# Patient Record
Sex: FEMALE | Race: BLACK OR AFRICAN AMERICAN
[De-identification: names, ages, dates, MRNs, and addresses within clinical notes are randomized per-mention and may not be internally consistent; named-entity substitution may affect disease eponyms.]

---

## 2014-09-28 VITALS
SYSTOLIC BLOOD PRESSURE: 126 MMHG | SYSTOLIC BLOOD PRESSURE: 126 MMHG | DIASTOLIC BLOOD PRESSURE: 70 MMHG | DIASTOLIC BLOOD PRESSURE: 70 MMHG | SYSTOLIC BLOOD PRESSURE: 126 MMHG | SYSTOLIC BLOOD PRESSURE: 126 MMHG | SYSTOLIC BLOOD PRESSURE: 126 MMHG | DIASTOLIC BLOOD PRESSURE: 70 MMHG | DIASTOLIC BLOOD PRESSURE: 70 MMHG | DIASTOLIC BLOOD PRESSURE: 70 MMHG | DIASTOLIC BLOOD PRESSURE: 70 MMHG | SYSTOLIC BLOOD PRESSURE: 126 MMHG | DIASTOLIC BLOOD PRESSURE: 70 MMHG | SYSTOLIC BLOOD PRESSURE: 126 MMHG | SYSTOLIC BLOOD PRESSURE: 126 MMHG | DIASTOLIC BLOOD PRESSURE: 70 MMHG | DIASTOLIC BLOOD PRESSURE: 70 MMHG | SYSTOLIC BLOOD PRESSURE: 126 MMHG

## 2018-12-13 ENCOUNTER — HOSPITAL ENCOUNTER (OUTPATIENT)
Dept: HOSPITAL 61 - KCIC MAMMO | Age: 58
Discharge: HOME | End: 2018-12-13
Attending: FAMILY MEDICINE
Payer: COMMERCIAL

## 2018-12-13 DIAGNOSIS — Z12.31: Primary | ICD-10-CM

## 2018-12-13 PROCEDURE — 77067 SCR MAMMO BI INCL CAD: CPT

## 2018-12-13 NOTE — [facility]
Bilateral digital screening mammograms:

 

Reason for examination: Routine screening.

 

Comparison is made to previous studies dated 9/29/2015 and 8/21/2014.

 

Interpretation was made with the benefit of CAD.

 

The skin and nipples show no abnormalities. No abnormal axillary lymph 

nodes are seen. The breast parenchyma shows scattered fibroglandular 

density. (Breast density: Category B.) There are small nodular parenchymal

densities consistent with intramammary lymph nodes bilaterally. There are 

no new dominant masses, suspicious calcifications or architectural 

distortions.

 

Impression:

 

No evidence of malignancy. Recommend routine screening.

 

BI-RADS Category 2: Benign.

 

"Our facility is accredited by the [employer] 

Mammography Program."

 

This patient's information has been entered into a reminder system for the

patient to be notified with the results of her examination and a target 

date for the next mammogram.

 

Electronically signed by: Fauzia Florentino MD (12/13/2018 4:54 PM) [id number]

## 2020-12-17 ENCOUNTER — HOSPITAL ENCOUNTER (OUTPATIENT)
Dept: HOSPITAL 61 - SPEC | Age: 60
End: 2020-12-17
Attending: FAMILY MEDICINE
Payer: COMMERCIAL

## 2020-12-17 DIAGNOSIS — E11.9: ICD-10-CM

## 2020-12-17 DIAGNOSIS — E78.5: ICD-10-CM

## 2020-12-17 DIAGNOSIS — I12.9: Primary | ICD-10-CM

## 2020-12-17 LAB
ALBUMIN SERPL-MCNC: 3.5 G/DL (ref 3.4–5)
ALBUMIN/GLOB SERPL: 1 {RATIO} (ref 1–1.7)
ALP SERPL-CCNC: 79 U/L (ref 46–116)
ALT SERPL-CCNC: 33 U/L (ref 14–59)
AMYLASE SERPL-CCNC: 81 U/L (ref 25–115)
ANION GAP SERPL CALC-SCNC: 8 MMOL/L (ref 6–14)
APTT PPP: YELLOW S
AST SERPL-CCNC: 23 U/L (ref 15–37)
BACTERIA #/AREA URNS HPF: 0 /HPF
BASOPHILS # BLD AUTO: 0 X10^3/UL (ref 0–0.2)
BASOPHILS NFR BLD: 0 % (ref 0–3)
BILIRUB SERPL-MCNC: 0.3 MG/DL (ref 0.2–1)
BILIRUB UR QL STRIP: NEGATIVE
BUN SERPL-MCNC: 19 MG/DL (ref 7–20)
BUN/CREAT SERPL: 21 (ref 6–20)
CALCIUM SERPL-MCNC: 9.1 MG/DL (ref 8.5–10.1)
CHLORIDE SERPL-SCNC: 104 MMOL/L (ref 98–107)
CHOLEST SERPL-MCNC: 205 MG/DL (ref 0–200)
CHOLEST/HDLC SERPL: 3.4 {RATIO}
CO2 SERPL-SCNC: 30 MMOL/L (ref 21–32)
CREAT SERPL-MCNC: 0.9 MG/DL (ref 0.6–1)
EOSINOPHIL NFR BLD: 0.1 X10^3/UL (ref 0–0.7)
EOSINOPHIL NFR BLD: 2 % (ref 0–3)
ERYTHROCYTE [DISTWIDTH] IN BLOOD BY AUTOMATED COUNT: 13.7 % (ref 11.5–14.5)
FIBRINOGEN PPP-MCNC: CLEAR MG/DL
GFR SERPLBLD BASED ON 1.73 SQ M-ARVRAT: 77.3 ML/MIN
GLUCOSE SERPL-MCNC: 85 MG/DL (ref 70–99)
HCT VFR BLD CALC: 40.6 % (ref 36–47)
HDLC SERPL-MCNC: 60 MG/DL (ref 40–60)
HGB BLD-MCNC: 13.5 G/DL (ref 12–15.5)
LDLC: 129 MG/DL (ref 0–100)
LIPASE: 176 U/L (ref 73–393)
LYMPHOCYTES # BLD: 2.5 X10^3/UL (ref 1–4.8)
LYMPHOCYTES NFR BLD AUTO: 50 % (ref 24–48)
MCH RBC QN AUTO: 31 PG (ref 25–35)
MCHC RBC AUTO-ENTMCNC: 33 G/DL (ref 31–37)
MCV RBC AUTO: 92 FL (ref 79–100)
MONO #: 0.4 X10^3/UL (ref 0–1.1)
MONOCYTES NFR BLD: 8 % (ref 0–9)
NEUT #: 2 X10^3/UL (ref 1.8–7.7)
NEUTROPHILS NFR BLD AUTO: 39 % (ref 31–73)
NITRITE UR QL STRIP: NEGATIVE
PH UR STRIP: 7 [PH]
PLATELET # BLD AUTO: 299 X10^3/UL (ref 140–400)
POTASSIUM SERPL-SCNC: 4.1 MMOL/L (ref 3.5–5.1)
PROT SERPL-MCNC: 7 G/DL (ref 6.4–8.2)
PROT UR STRIP-MCNC: NEGATIVE MG/DL
RBC # BLD AUTO: 4.41 X10^6/UL (ref 3.5–5.4)
RBC #/AREA URNS HPF: 0 /HPF (ref 0–2)
SODIUM SERPL-SCNC: 142 MMOL/L (ref 136–145)
TRIGL SERPL-MCNC: 82 MG/DL (ref 0–150)
UROBILINOGEN UR-MCNC: 1 MG/DL
VLDLC: 16 MG/DL (ref 0–40)
WBC # BLD AUTO: 5 X10^3/UL (ref 4–11)
WBC #/AREA URNS HPF: (no result) /HPF (ref 0–4)

## 2020-12-17 PROCEDURE — 80061 LIPID PANEL: CPT

## 2020-12-17 PROCEDURE — 83036 HEMOGLOBIN GLYCOSYLATED A1C: CPT

## 2020-12-17 PROCEDURE — 82150 ASSAY OF AMYLASE: CPT

## 2020-12-17 PROCEDURE — 85025 COMPLETE CBC W/AUTO DIFF WBC: CPT

## 2020-12-17 PROCEDURE — 87086 URINE CULTURE/COLONY COUNT: CPT

## 2020-12-17 PROCEDURE — 81001 URINALYSIS AUTO W/SCOPE: CPT

## 2020-12-17 PROCEDURE — 36415 COLL VENOUS BLD VENIPUNCTURE: CPT

## 2020-12-17 PROCEDURE — 83690 ASSAY OF LIPASE: CPT

## 2020-12-17 PROCEDURE — 80053 COMPREHEN METABOLIC PANEL: CPT

## 2020-12-18 LAB — HBA1C MFR BLD: 6.2 % (ref 4.8–5.6)

## 2021-02-02 ENCOUNTER — HOSPITAL ENCOUNTER (OUTPATIENT)
Dept: HOSPITAL 61 - US | Age: 61
End: 2021-02-02
Attending: FAMILY MEDICINE
Payer: COMMERCIAL

## 2021-02-02 DIAGNOSIS — R10.30: Primary | ICD-10-CM

## 2021-02-02 PROCEDURE — 76770 US EXAM ABDO BACK WALL COMP: CPT

## 2021-02-02 NOTE — RAD
Retroperitoneal ultrasound complete



INDICATION: Lower abdominal pain and right flank pain per patient



TECHNIQUE: Grayscale and color Doppler imaging of the retroperitoneum focused on the kidneys and urin
sylvia bladder was performed.



FINDINGS:

The right kidney measures 10.7 x 4.0 x 4.0 cm and shows no hydronephrosis, shadowing stones or masses




Left kidney measures 11.2 x 4.0 x 5.0 cm and shows no hydronephrosis masses or shadowing stones



Urinary bladder is unremarkable on the available views. Ureteral jets were not assessed. Post void re
sidual was also not evaluated



IMPRESSION:



There is no evidence of hydronephrosis or shadowing stones in either kidney. No specific cause for ri
ght flank pain or lower abdominal pain is identified.



Electronically signed by: Ivet Salas MD (2/2/2021 6:53 PM) [id number]

## 2021-05-24 ENCOUNTER — HOSPITAL ENCOUNTER (OUTPATIENT)
Dept: HOSPITAL 61 - LAB | Age: 61
End: 2021-05-24
Attending: FAMILY MEDICINE
Payer: COMMERCIAL

## 2021-05-24 DIAGNOSIS — E55.9: ICD-10-CM

## 2021-05-24 DIAGNOSIS — E11.9: ICD-10-CM

## 2021-05-24 DIAGNOSIS — E78.5: ICD-10-CM

## 2021-05-24 DIAGNOSIS — R53.83: Primary | ICD-10-CM

## 2021-05-24 DIAGNOSIS — L12.9: ICD-10-CM

## 2021-05-24 LAB
ALBUMIN SERPL-MCNC: 3.9 G/DL (ref 3.4–5)
ALBUMIN/GLOB SERPL: 1.2 {RATIO} (ref 1–1.7)
ALP SERPL-CCNC: 76 U/L (ref 46–116)
ALT SERPL-CCNC: 26 U/L (ref 14–59)
ANION GAP SERPL CALC-SCNC: 10 MMOL/L (ref 6–14)
AST SERPL-CCNC: 17 U/L (ref 15–37)
BASOPHILS # BLD AUTO: 0 X10^3/UL (ref 0–0.2)
BASOPHILS NFR BLD: 0 % (ref 0–3)
BILIRUB SERPL-MCNC: 0.4 MG/DL (ref 0.2–1)
BUN SERPL-MCNC: 17 MG/DL (ref 7–20)
BUN/CREAT SERPL: 19 (ref 6–20)
CALCIUM SERPL-MCNC: 8.8 MG/DL (ref 8.5–10.1)
CHLORIDE SERPL-SCNC: 110 MMOL/L (ref 98–107)
CHOLEST SERPL-MCNC: 171 MG/DL (ref 0–200)
CHOLEST/HDLC SERPL: 2.4 {RATIO}
CO2 SERPL-SCNC: 26 MMOL/L (ref 21–32)
CREAT SERPL-MCNC: 0.9 MG/DL (ref 0.6–1)
CRP SERPL-MCNC: 3.2 MG/L (ref 0–3.3)
EOSINOPHIL NFR BLD: 0.1 X10^3/UL (ref 0–0.7)
EOSINOPHIL NFR BLD: 3 % (ref 0–3)
ERYTHROCYTE [DISTWIDTH] IN BLOOD BY AUTOMATED COUNT: 14 % (ref 11.5–14.5)
GFR SERPLBLD BASED ON 1.73 SQ M-ARVRAT: 77.3 ML/MIN
GLUCOSE SERPL-MCNC: 95 MG/DL (ref 70–99)
HCT VFR BLD CALC: 38.7 % (ref 36–47)
HDLC SERPL-MCNC: 70 MG/DL (ref 40–60)
HGB BLD-MCNC: 12.9 G/DL (ref 12–15.5)
LDLC: 87 MG/DL (ref 0–100)
LYMPHOCYTES # BLD: 2.2 X10^3/UL (ref 1–4.8)
LYMPHOCYTES NFR BLD AUTO: 41 % (ref 24–48)
MCH RBC QN AUTO: 31 PG (ref 25–35)
MCHC RBC AUTO-ENTMCNC: 34 G/DL (ref 31–37)
MCV RBC AUTO: 91 FL (ref 79–100)
MONO #: 0.5 X10^3/UL (ref 0–1.1)
MONOCYTES NFR BLD: 10 % (ref 0–9)
NEUT #: 2.5 X10^3/UL (ref 1.8–7.7)
NEUTROPHILS NFR BLD AUTO: 46 % (ref 31–73)
PLATELET # BLD AUTO: 273 X10^3/UL (ref 140–400)
POTASSIUM SERPL-SCNC: 4.2 MMOL/L (ref 3.5–5.1)
PROT SERPL-MCNC: 7.1 G/DL (ref 6.4–8.2)
RBC # BLD AUTO: 4.23 X10^6/UL (ref 3.5–5.4)
SODIUM SERPL-SCNC: 146 MMOL/L (ref 136–145)
TRIGL SERPL-MCNC: 68 MG/DL (ref 0–150)
VLDLC: 14 MG/DL (ref 0–40)
WBC # BLD AUTO: 5.4 X10^3/UL (ref 4–11)

## 2021-05-24 PROCEDURE — 83036 HEMOGLOBIN GLYCOSYLATED A1C: CPT

## 2021-05-24 PROCEDURE — 86140 C-REACTIVE PROTEIN: CPT

## 2021-05-24 PROCEDURE — 80053 COMPREHEN METABOLIC PANEL: CPT

## 2021-05-24 PROCEDURE — 86038 ANTINUCLEAR ANTIBODIES: CPT

## 2021-05-24 PROCEDURE — 86060 ANTISTREPTOLYSIN O TITER: CPT

## 2021-05-24 PROCEDURE — 80061 LIPID PANEL: CPT

## 2021-05-24 PROCEDURE — 85025 COMPLETE CBC W/AUTO DIFF WBC: CPT

## 2021-05-24 PROCEDURE — 84443 ASSAY THYROID STIM HORMONE: CPT

## 2021-05-24 PROCEDURE — 82652 VIT D 1 25-DIHYDROXY: CPT

## 2021-05-24 PROCEDURE — 82607 VITAMIN B-12: CPT

## 2021-05-24 PROCEDURE — 36415 COLL VENOUS BLD VENIPUNCTURE: CPT

## 2021-05-25 LAB
ASO AB SERPL-ACNC: <20 IU/ML (ref 0–200)
HBA1C MFR BLD: 6.2 % (ref 4.8–5.6)

## 2021-07-28 ENCOUNTER — HOSPITAL ENCOUNTER (OUTPATIENT)
Dept: HOSPITAL 61 - RAD | Age: 61
End: 2021-07-28
Attending: FAMILY MEDICINE
Payer: COMMERCIAL

## 2021-07-28 DIAGNOSIS — R76.8: Primary | ICD-10-CM

## 2021-07-28 PROCEDURE — 73100 X-RAY EXAM OF WRIST: CPT

## 2021-07-28 NOTE — RAD
EXAM: 

1. XR RT WRIST 2 VIEWS,

2. XR LT WRIST 2 VIEWS.



HISTORY: Seropositive arthritis.



COMPARISON: None.



FINDINGS: Joint spaces and alignment are maintained throughout both wrists. There are no erosions or 
clear soft tissue swelling. There is no periarticular osteopenia. No fractures are identified.



IMPRESSION: 

1. No radiographic abnormality.



Electronically signed by: SANDRA Beckett MD (7/28/2021 4:15 PM) [facility]

## 2021-07-28 NOTE — RAD
EXAM: 

1. XR RT WRIST 2 VIEWS,

2. XR LT WRIST 2 VIEWS.



HISTORY: Seropositive arthritis.



COMPARISON: None.



FINDINGS: Joint spaces and alignment are maintained throughout both wrists. There are no erosions or 
clear soft tissue swelling. There is no periarticular osteopenia. No fractures are identified.



IMPRESSION: 

1. No radiographic abnormality.



Electronically signed by: SANDRA Beckett MD (7/28/2021 4:15 PM) [facility]

## 2021-07-28 NOTE — RAD
EXAM: Bilateral knees, 2 views.



HISTORY: Centromere antibody positive arthritis.



COMPARISON: None.



FINDINGS: No fractures are identified bilaterally. Joint spaces and alignment are maintained. There i
s no joint effusion.



IMPRESSION: 

1. No radiographic abnormality.



Electronically signed by: SANDRA Beckett MD (7/28/2021 4:16 PM) [facility]

## 2021-07-29 ENCOUNTER — HOSPITAL ENCOUNTER (OUTPATIENT)
Dept: HOSPITAL 61 - KCIC MAMMO | Age: 61
End: 2021-07-29
Attending: FAMILY MEDICINE
Payer: COMMERCIAL

## 2021-07-29 DIAGNOSIS — Z12.31: Primary | ICD-10-CM

## 2021-07-29 PROCEDURE — 77067 SCR MAMMO BI INCL CAD: CPT

## 2021-07-29 NOTE — [facility]
EXAM: Bilateral screening mammogram.



HISTORY: 60-year-old female presents for screening mammography.



TECHNIQUE: Full-field digital craniocaudal and mediolateral oblique views of both breasts are obtaine
d for evaluation. Computer aided detection was applied.



COMPARISON: 12/13/2018



BREAST PARENCHYMAL DENSITY: Level B - Scattered fibroglandular densities.



FINDINGS: There is no new suspicious mass, microcalcification or region of architectural distortion. 
There are stable areas of asymmetry and nodularity within both breasts, allowing for differences in p
atient positioning.



IMPRESSION: BI-RADS Category 2: Benign finding(s). 



RECOMMENDATION: Annual mammography is recommended.



If your mammogram demonstrates that you have dense breast tissue, which could hide abnormalities, and
 if you have other risk factors for breast cancer that have been identified, you might benefit from s
upplemental screening tests that may be suggested by your ordering physician.  Dense breast tissue, i
n and of itself, is a relatively common condition.  This information is not provided to cause undue c
oncern, but rather to raise your awareness and to promote discussion with your physician regarding th
e presence of other risk factors, in addition to dense breast tissue. A report of your mammography re
sults will be sent to you and your physician.  You should contact your physician if you have any ques
tions or concerns regarding this report.  



Mammography is a sensitive method for finding small breast cancers, but it does not detect them all a
nd is not a substitute for careful clinical examination.  A negative mammogram does not negate a clin
ically suspicious finding and should not result in delay in biopsying a clinically suspicious abnorma
lity.



PQRS compliance statement -  Patient information was entered into a reminder system with a target due
 date for the next mammogram. 



"Our facility is accredited by the American [employer] Mammography Program."



Electronically signed by: Maryjane Maldonado MD (7/29/2021 11:51 AM) [id number]

## 2021-08-13 ENCOUNTER — HOSPITAL ENCOUNTER (OUTPATIENT)
Dept: HOSPITAL 61 - SPEC | Age: 61
End: 2021-08-13
Attending: OBSTETRICS & GYNECOLOGY
Payer: COMMERCIAL

## 2021-08-13 DIAGNOSIS — Z01.419: Primary | ICD-10-CM

## 2021-08-13 PROCEDURE — 88175 CYTOPATH C/V AUTO FLUID REDO: CPT

## 2021-08-13 PROCEDURE — 87623 HPV LOW-RISK TYPES: CPT

## 2022-03-18 ENCOUNTER — HOSPITAL ENCOUNTER (OUTPATIENT)
Dept: HOSPITAL 61 - RAD | Age: 62
End: 2022-03-18
Payer: COMMERCIAL

## 2022-03-18 DIAGNOSIS — M77.32: ICD-10-CM

## 2022-03-18 DIAGNOSIS — R76.8: ICD-10-CM

## 2022-03-18 DIAGNOSIS — M19.071: ICD-10-CM

## 2022-03-18 DIAGNOSIS — M48.07: ICD-10-CM

## 2022-03-18 DIAGNOSIS — M47.817: Primary | ICD-10-CM

## 2022-03-18 PROCEDURE — 73521 X-RAY EXAM HIPS BI 2 VIEWS: CPT

## 2022-03-18 PROCEDURE — 72202 X-RAY EXAM SI JOINTS 3/> VWS: CPT

## 2022-03-18 PROCEDURE — 72100 X-RAY EXAM L-S SPINE 2/3 VWS: CPT

## 2022-03-18 NOTE — RAD
2 views the bilateral hands without comparison for elevated antinuclear antibody level.



FINDINGS: There is no fracture, dislocation, or acute osseous abnormality identified. Joints and soft
 tissues are grossly unremarkable. No juxta articular osteopenia or erosions.



IMPRESSION:

1. No acute osseous abnormalities.

2. No radiographic stigmata of inflammatory arthropathy.



Electronically signed by: Mina Torres MD (3/18/2022 1:03 PM) [id number]

## 2022-03-18 NOTE — RAD
Single AP view the pelvis and 2 views of each hip without comparison for elevated antinuclear antibod
y levels.



FINDINGS: Degenerative facet arthrosis of the lower lumbar spine. No fracture or acute osseous abnorm
ality of the pelvis or either hip. Mild degenerative changes of the right hip with no significant deg
enerative changes of left hip. No erosions or juxta-articular osteopenia. Mild irregularity SI joint 
is likely chronic and related to old injury.



IMPRESSION:

1. No acute osseous abnormality of the bony pelvis or either hip. No stigmata of inflammatory arthrop
athy.



Electronically signed by: Mina Torres MD (3/18/2022 12:03 PM) [id number]

## 2022-03-18 NOTE — RAD
2 views of the lumbar spine without comparison for elevated antinuclear antibody level.



FINDINGS: No fracture or acute osseous or alignment abnormality. Intervertebral disc spaces are well-
maintained save for minimal narrowing at L5-S1. There is facet arthrosis at L4-5 and L5-S1. Mild vasc
ular calcification are seen in the aorta.



IMPRESSION:

1. No acute osseous or alignment abnormality.

2. Mild spondylosis at L5-S1.



Electronically signed by: Mina Torres MD (3/18/2022 1:02 PM) [id number]

## 2022-03-18 NOTE — RAD
2 views of each foot without comparison for elevated antinuclear antibody level.



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 Small left
 calcaneal bone spur. No acute osseous abnormalities. Remaining joints and soft tissues are grossly u
nremarkable. No juxta articular osteopenia or periarticular erosions.



IMPRESSION:

1. No acute osseous abnormalities.

2. No radiographic stigmata of inflammatory arthropathy.



Electronically signed by: Mina Torres MD (3/18/2022 1:04 PM) [id number]

## 2022-03-18 NOTE — RAD
2 views the bilateral SI joints without comparison for elevated antinuclear antibody level.



FINDINGS: No significant abnormalities of the SI joints.



IMPRESSION:

1. No significant abnormalities of the SI joints. Specifically no radiographic stigmata of inflammato
ry arthropathy.



Electronically signed by: Mina Torres MD (3/18/2022 1:00 PM) [id number]

## 2022-04-26 ENCOUNTER — HOSPITAL ENCOUNTER (OUTPATIENT)
Dept: HOSPITAL 61 - LAB | Age: 62
End: 2022-04-26
Attending: FAMILY MEDICINE
Payer: COMMERCIAL

## 2022-04-26 DIAGNOSIS — E11.9: Primary | ICD-10-CM

## 2022-04-26 PROCEDURE — 36415 COLL VENOUS BLD VENIPUNCTURE: CPT

## 2022-04-26 PROCEDURE — 83036 HEMOGLOBIN GLYCOSYLATED A1C: CPT

## 2022-04-27 LAB — HBA1C MFR BLD: 6.5 % (ref 4.8–5.6)
